# Patient Record
Sex: FEMALE | Race: WHITE | NOT HISPANIC OR LATINO | URBAN - METROPOLITAN AREA
[De-identification: names, ages, dates, MRNs, and addresses within clinical notes are randomized per-mention and may not be internally consistent; named-entity substitution may affect disease eponyms.]

---

## 2023-05-23 ENCOUNTER — OFFICE (OUTPATIENT)
Dept: URBAN - METROPOLITAN AREA CLINIC 11 | Facility: CLINIC | Age: 33
End: 2023-05-23

## 2023-05-23 VITALS
DIASTOLIC BLOOD PRESSURE: 78 MMHG | HEIGHT: 64 IN | HEART RATE: 100 BPM | OXYGEN SATURATION: 100 % | WEIGHT: 273 LBS | SYSTOLIC BLOOD PRESSURE: 118 MMHG

## 2023-05-23 DIAGNOSIS — K60.2 ANAL FISSURE, UNSPECIFIED: ICD-10-CM

## 2023-05-23 PROCEDURE — 99204 OFFICE O/P NEW MOD 45 MIN: CPT | Performed by: INTERNAL MEDICINE

## 2023-07-25 ENCOUNTER — OFFICE (OUTPATIENT)
Dept: URBAN - METROPOLITAN AREA CLINIC 11 | Facility: CLINIC | Age: 33
End: 2023-07-25

## 2023-07-25 VITALS
SYSTOLIC BLOOD PRESSURE: 124 MMHG | HEIGHT: 64 IN | WEIGHT: 274 LBS | OXYGEN SATURATION: 97 % | DIASTOLIC BLOOD PRESSURE: 81 MMHG | HEART RATE: 100 BPM

## 2023-07-25 DIAGNOSIS — K60.2 ANAL FISSURE, UNSPECIFIED: ICD-10-CM

## 2023-07-25 DIAGNOSIS — R12 HEARTBURN: ICD-10-CM

## 2023-07-25 PROCEDURE — 99214 OFFICE O/P EST MOD 30 MIN: CPT | Performed by: INTERNAL MEDICINE

## 2023-07-25 RX ORDER — PANTOPRAZOLE 40 MG/1
40 TABLET, DELAYED RELEASE ORAL
Qty: 30 | Refills: 6 | Status: ACTIVE

## 2023-11-30 ENCOUNTER — OFFICE (OUTPATIENT)
Dept: URBAN - METROPOLITAN AREA CLINIC 11 | Facility: CLINIC | Age: 33
End: 2023-11-30

## 2023-11-30 VITALS
HEART RATE: 110 BPM | DIASTOLIC BLOOD PRESSURE: 78 MMHG | OXYGEN SATURATION: 95 % | WEIGHT: 271 LBS | HEIGHT: 64 IN | SYSTOLIC BLOOD PRESSURE: 125 MMHG

## 2023-11-30 DIAGNOSIS — K21.9 GASTRO-ESOPHAGEAL REFLUX DISEASE WITHOUT ESOPHAGITIS: ICD-10-CM

## 2023-11-30 DIAGNOSIS — K60.2 ANAL FISSURE, UNSPECIFIED: ICD-10-CM

## 2023-11-30 DIAGNOSIS — K59.00 CONSTIPATION, UNSPECIFIED: ICD-10-CM

## 2023-11-30 PROCEDURE — 99213 OFFICE O/P EST LOW 20 MIN: CPT | Performed by: INTERNAL MEDICINE

## 2023-11-30 NOTE — SERVICENOTES
23 minutes were spent interviewing and examining the patient, discussing the plan of care, and performing documentation required for today's visit

## 2023-11-30 NOTE — SERVICEHPINOTES
Ms. Radha Hernandez is a 32yo that presents for follow-up upper anal fissure.  At her initial visit her symptoms seemed most consistent with an anal fissure and therefore I started her on topical diltiazem.  In follow-up she reported that initially she did not get much relief but over time she stuck with the therapy plan and noticed improvement.  At last visit she reported that she was about 85 percent better and therefore I refilled her topical diltiazem to continue the course for another 4 weeks see if we can get resolution. Her GERD remains well controlled on pantoprazole.  In follow-up today she says that she is doing better overall.  Her symptoms have almost completely resolved after her last course of the topical diltiazem.  She says occasionally after a hard stool she will have some mild staying in the anal area but that only last for a few minutes and response to topical lidocaine.  In between these episodes she is not have any symptoms and overall says her fissure has resolved.  Her GERD continues to be well controlled with only  Occasional breakthrough use related to increased stress or anxiety or eating a known trigger food.  She was previously seen by Dr. Coyne here who has subsequently relocated but was last seen in 2015.  An outline of her history can be seen below.  Since she was last seen here it appears she has been following up with Dr. Page and GI specialists.  Based on outside records which I reviewed, she underwent an upper endoscopy in January of this year where she had some possible mild gastritis and underwent biopsies of the duodenum and stomach which were largely unrevealing but any significant abnormalities and H pylori was negative.  It also appears she had an abdominal ultrasound as well as a CMP and CBC order this year by her PCP which were also without any significant abnormalities.  She reports a lifelong history of GI tract issues.  Apparently after she saw all us in 2015 she moved to [place] and lived there for several years but recently moved back late last year.  Again, she saw all GI specialist and underwent the upper endoscopy as outlined but had been stable regarding her GI tract issues until a couple weeks ago.  She says that she started noticing some anal bleeding and pain.  She attributed this to hemorrhoids as she believes she had an issue with hemorrhoid several years ago.  She said that she has been using over-the-counter topical therapies and with this it would get slightly better but never totally resolved.  She would then have a large bowel movement and things would restart with some red blood and pain.  She describes a sharp pain bowel movements.  She says she will have some altered bowel habits which are chronic and she will sometimes have some diarrhea as well as some constipation.  She also occasionally have some itching.br  In the past she was followed in clinic related to a past medical history that includes alternating-type irritable bowel syndrome and chronic GERD. She was previously under the care of [facility]. Of note, she had both an EGD and colonoscopy in 2015. Her EGD revealed benign findings and her colonoscopy was normal to the terminal ileum.  in follow-up she had ongoing issues with refractory GERD despite PPI as well as diarrhea which was thought to be related to irritable bowel type syndrome. She has tried several medications and supplemental fiber and is currently managing her IBS with dietary modifications and exercise alone.